# Patient Record
Sex: FEMALE | URBAN - METROPOLITAN AREA
[De-identification: names, ages, dates, MRNs, and addresses within clinical notes are randomized per-mention and may not be internally consistent; named-entity substitution may affect disease eponyms.]

---

## 2018-05-23 ENCOUNTER — APPOINTMENT (RX ONLY)
Dept: URBAN - METROPOLITAN AREA CLINIC 157 | Facility: CLINIC | Age: 73
Setting detail: DERMATOLOGY
End: 2018-05-23

## 2018-05-23 DIAGNOSIS — L50.8 OTHER URTICARIA: ICD-10-CM | Status: INADEQUATELY CONTROLLED

## 2018-05-23 DIAGNOSIS — R23.1 PALLOR: ICD-10-CM

## 2018-05-23 PROBLEM — I10 ESSENTIAL (PRIMARY) HYPERTENSION: Status: ACTIVE | Noted: 2018-05-23

## 2018-05-23 PROCEDURE — 99202 OFFICE O/P NEW SF 15 MIN: CPT

## 2018-05-23 PROCEDURE — ? DIAGNOSIS COMMENT

## 2018-05-23 PROCEDURE — ? COUNSELING

## 2018-05-23 PROCEDURE — ? ORDER TESTS

## 2018-05-23 ASSESSMENT — LOCATION SIMPLE DESCRIPTION DERM
LOCATION SIMPLE: RIGHT FOREARM
LOCATION SIMPLE: LEFT ANTERIOR NECK
LOCATION SIMPLE: LEFT UPPER BACK
LOCATION SIMPLE: LEFT FOREARM

## 2018-05-23 ASSESSMENT — LOCATION ZONE DERM
LOCATION ZONE: TRUNK
LOCATION ZONE: NECK
LOCATION ZONE: ARM

## 2018-05-23 ASSESSMENT — LOCATION DETAILED DESCRIPTION DERM
LOCATION DETAILED: LEFT INFERIOR ANTERIOR NECK
LOCATION DETAILED: RIGHT VENTRAL PROXIMAL FOREARM
LOCATION DETAILED: LEFT VENTRAL DISTAL FOREARM
LOCATION DETAILED: LEFT SUPERIOR MEDIAL UPPER BACK

## 2018-05-23 NOTE — PROCEDURE: ORDER TESTS
Billing Type: Third-Party Bill
Lab Facility: [id number]
Bill For Surgical Tray: no
Expected Date Of Service: 05/23/2018
Performing Laboratory: [id number]

## 2018-05-23 NOTE — PROCEDURE: DIAGNOSIS COMMENT
Comment: Patient has a history of urticaria for the past 6 months. Currently on Zyrtec 10mg PO BID and Hydroxyzine 25mg PO at night. Will await lab results
Detail Level: Simple

## 2018-05-29 ENCOUNTER — RX ONLY (OUTPATIENT)
Age: 73
Setting detail: RX ONLY
End: 2018-05-29

## 2018-05-29 RX ORDER — SALICYLIC ACID 3 G/100G
LOTION TOPICAL
Qty: 30 | Refills: 0 | Status: ERX | COMMUNITY
Start: 2018-05-29

## 2018-05-29 RX ORDER — TRIAMCINOLONE ACETONIDE 1 MG/G
CREAM TOPICAL
Qty: 454 | Refills: 0 | Status: ERX

## 2018-07-05 ENCOUNTER — APPOINTMENT (RX ONLY)
Dept: URBAN - METROPOLITAN AREA CLINIC 157 | Facility: CLINIC | Age: 73
Setting detail: DERMATOLOGY
End: 2018-07-05

## 2018-07-05 DIAGNOSIS — L50.8 OTHER URTICARIA: ICD-10-CM

## 2018-07-05 PROCEDURE — ? COUNSELING

## 2018-07-05 PROCEDURE — ? DIAGNOSIS COMMENT

## 2018-07-05 PROCEDURE — 99213 OFFICE O/P EST LOW 20 MIN: CPT

## 2018-07-05 PROCEDURE — ? TREATMENT REGIMEN

## 2018-07-05 ASSESSMENT — LOCATION SIMPLE DESCRIPTION DERM
LOCATION SIMPLE: RIGHT POSTERIOR UPPER ARM
LOCATION SIMPLE: LEFT UPPER BACK
LOCATION SIMPLE: LEFT POSTERIOR UPPER ARM

## 2018-07-05 ASSESSMENT — LOCATION ZONE DERM
LOCATION ZONE: ARM
LOCATION ZONE: TRUNK

## 2018-07-05 ASSESSMENT — LOCATION DETAILED DESCRIPTION DERM
LOCATION DETAILED: LEFT DISTAL POSTERIOR UPPER ARM
LOCATION DETAILED: LEFT MEDIAL UPPER BACK
LOCATION DETAILED: RIGHT DISTAL POSTERIOR UPPER ARM

## 2018-07-05 NOTE — PROCEDURE: DIAGNOSIS COMMENT
Comment: Recommend to keep taking antihistamines around the clock and to have her thyroid checked at her upcoming appt with her PCP who has been monitoring her thyroid.  Discussed antihistamines cannot be as needed. It needs to be taken around the clock. Patient counseled on starting Xolair and need to carry an Epipen in her purse and to her monthly Xolair injections
Detail Level: Simple

## 2018-07-16 NOTE — PROCEDURE: TREATMENT REGIMEN
Continue Regimen: 10 mg of Zyrtec and 180 mg of Allegra in the morning, 10 mg of Zyrtec at lunch time, and Benadryl and Zyrtec at bedtime
Detail Level: Simple
Alert-The patient is alert, awake and responds to voice. The patient is oriented to time, place, and person. The triage nurse is able to obtain subjective information.

## 2018-07-20 ENCOUNTER — RX ONLY (OUTPATIENT)
Age: 73
Setting detail: RX ONLY
End: 2018-07-20

## 2018-07-20 RX ORDER — AMPICILLIN TRIHYDRATE 500 MG
CAPSULE ORAL
Qty: 30 | Refills: 1 | Status: ERX | COMMUNITY
Start: 2018-07-20

## 2018-07-20 RX ORDER — PREDNISONE 5 MG/1
TABLET ORAL
Qty: 30 | Refills: 0 | Status: ERX | COMMUNITY
Start: 2018-07-20

## 2018-07-20 RX ORDER — CALCIUM CARBONATE 500(1250)
TABLET ORAL
Qty: 30 | Refills: 1 | Status: ERX | COMMUNITY
Start: 2018-07-20

## 2018-08-10 ENCOUNTER — APPOINTMENT (RX ONLY)
Dept: URBAN - METROPOLITAN AREA CLINIC 156 | Facility: CLINIC | Age: 73
Setting detail: DERMATOLOGY
End: 2018-08-10

## 2018-08-10 VITALS — SYSTOLIC BLOOD PRESSURE: 127 MMHG | DIASTOLIC BLOOD PRESSURE: 60 MMHG | HEART RATE: 60 BPM | RESPIRATION RATE: 16 BRPM

## 2018-08-10 VITALS — HEART RATE: 59 BPM | RESPIRATION RATE: 16 BRPM | DIASTOLIC BLOOD PRESSURE: 60 MMHG | SYSTOLIC BLOOD PRESSURE: 108 MMHG

## 2018-08-10 DIAGNOSIS — L50.8 OTHER URTICARIA: ICD-10-CM

## 2018-08-10 PROCEDURE — ? TREATMENT REGIMEN

## 2018-08-10 PROCEDURE — ? XOLAIR INJECTION

## 2018-08-10 PROCEDURE — 99212 OFFICE O/P EST SF 10 MIN: CPT | Mod: 25

## 2018-08-10 PROCEDURE — ? OTHER

## 2018-08-10 PROCEDURE — ? SEPARATE AND IDENTIFIABLE DOCUMENTATION

## 2018-08-10 PROCEDURE — ? COUNSELING

## 2018-08-10 NOTE — PROCEDURE: XOLAIR INJECTION
Location Of Injection #2: left arm
Next Injection Location: in the office
Bill J-Code: yes
Next Injection: 4 weeks
Medication (Total Amount Injected): Xolair 150 mg
Number Of Injections: Two
Administered By (Optional): Sade Driscoll CMA
Detail Level: Simple
Expiration Date (Optional): 11/2021
Location Of Injection #1: right arm
Lot # (Optional): [id number]
Consent: The risks of the medication were reviewed with the patient.
Other Lot # (Optional): [id number]

## 2018-08-10 NOTE — PROCEDURE: TREATMENT REGIMEN
Continue Regimen: Zyrtec 10mg and 180 mg of Allegra in the morning, Zyrtec 10mg at lunch time, and Benadryl and Zyrtec at bedtime
Detail Level: Simple

## 2018-09-07 ENCOUNTER — APPOINTMENT (RX ONLY)
Dept: URBAN - METROPOLITAN AREA CLINIC 156 | Facility: CLINIC | Age: 73
Setting detail: DERMATOLOGY
End: 2018-09-07

## 2018-09-07 VITALS — SYSTOLIC BLOOD PRESSURE: 139 MMHG | HEART RATE: 58 BPM | DIASTOLIC BLOOD PRESSURE: 76 MMHG

## 2018-09-07 DIAGNOSIS — L50.8 OTHER URTICARIA: ICD-10-CM

## 2018-09-07 PROCEDURE — ? TREATMENT REGIMEN

## 2018-09-07 PROCEDURE — ? XOLAIR INJECTION

## 2018-09-07 PROCEDURE — ? COUNSELING

## 2018-09-07 ASSESSMENT — LOCATION SIMPLE DESCRIPTION DERM
LOCATION SIMPLE: RIGHT FOREARM
LOCATION SIMPLE: LEFT FOREARM
LOCATION SIMPLE: ABDOMEN

## 2018-09-07 ASSESSMENT — LOCATION DETAILED DESCRIPTION DERM
LOCATION DETAILED: EPIGASTRIC SKIN
LOCATION DETAILED: LEFT VENTRAL PROXIMAL FOREARM
LOCATION DETAILED: RIGHT VENTRAL PROXIMAL FOREARM

## 2018-09-07 ASSESSMENT — LOCATION ZONE DERM
LOCATION ZONE: TRUNK
LOCATION ZONE: ARM

## 2018-09-07 NOTE — PROCEDURE: XOLAIR INJECTION
Lot # (Optional): [id number]
Number Of Injections: One
Detail Level: Simple
Next Injection Location: in the office
Expiration Date (Optional): 2/2022
Bill J-Code: yes
Consent: The risks of the medication were reviewed with the patient.

## 2018-09-07 NOTE — PROCEDURE: TREATMENT REGIMEN
Initiate Treatment: Allegra 180 mg - one tablet daily
Plan: Advised to discontinue Zyrtec and start Xyzal\\nEpipen present in office today
Detail Level: Zone
Samples Given: Nolix cream - apply to affected areas twice daily as needed

## 2018-10-12 ENCOUNTER — APPOINTMENT (RX ONLY)
Dept: URBAN - METROPOLITAN AREA CLINIC 156 | Facility: CLINIC | Age: 73
Setting detail: DERMATOLOGY
End: 2018-10-12

## 2018-10-12 DIAGNOSIS — L50.8 OTHER URTICARIA: ICD-10-CM

## 2018-10-12 PROCEDURE — ? DIAGNOSIS COMMENT

## 2018-10-12 PROCEDURE — ? TREATMENT REGIMEN

## 2018-10-12 PROCEDURE — ? COUNSELING

## 2018-10-12 PROCEDURE — ? SEPARATE AND IDENTIFIABLE DOCUMENTATION

## 2018-10-12 PROCEDURE — ? XOLAIR INJECTION

## 2018-10-12 PROCEDURE — 99213 OFFICE O/P EST LOW 20 MIN: CPT | Mod: 25

## 2018-10-12 ASSESSMENT — LOCATION DETAILED DESCRIPTION DERM
LOCATION DETAILED: RIGHT VENTRAL PROXIMAL FOREARM
LOCATION DETAILED: LEFT PROXIMAL POSTERIOR UPPER ARM
LOCATION DETAILED: PERIUMBILICAL SKIN
LOCATION DETAILED: LEFT VENTRAL PROXIMAL FOREARM
LOCATION DETAILED: RIGHT PROXIMAL POSTERIOR UPPER ARM

## 2018-10-12 ASSESSMENT — LOCATION SIMPLE DESCRIPTION DERM
LOCATION SIMPLE: LEFT POSTERIOR UPPER ARM
LOCATION SIMPLE: RIGHT FOREARM
LOCATION SIMPLE: ABDOMEN
LOCATION SIMPLE: LEFT FOREARM
LOCATION SIMPLE: RIGHT POSTERIOR UPPER ARM

## 2018-10-12 ASSESSMENT — LOCATION ZONE DERM
LOCATION ZONE: TRUNK
LOCATION ZONE: ARM

## 2018-10-12 NOTE — PROCEDURE: XOLAIR INJECTION
Expiration Date (Optional): 
Medication (Total Amount Injected): Xolair 150 mg
Lot # (Optional): [id number]
Number Of Injections: One
Next Injection Location: in the office
Bill J-Code: yes
Consent: The risks of the medication were reviewed with the patient.
Detail Level: Simple

## 2018-10-12 NOTE — PROCEDURE: TREATMENT REGIMEN
Continue Regimen: Allegra 180 mg once daily
Detail Level: Zone
Plan: Epipen present in office today
Discontinue Regimen: Zyrtec and start Xyzal

## 2018-11-20 ENCOUNTER — APPOINTMENT (RX ONLY)
Dept: URBAN - METROPOLITAN AREA CLINIC 156 | Facility: CLINIC | Age: 73
Setting detail: DERMATOLOGY
End: 2018-11-20

## 2018-11-20 VITALS — WEIGHT: 135 LBS | HEART RATE: 62 BPM | SYSTOLIC BLOOD PRESSURE: 105 MMHG | DIASTOLIC BLOOD PRESSURE: 68 MMHG

## 2018-11-20 DIAGNOSIS — L50.8 OTHER URTICARIA: ICD-10-CM | Status: STABLE

## 2018-11-20 PROCEDURE — ? TREATMENT REGIMEN

## 2018-11-20 PROCEDURE — 99212 OFFICE O/P EST SF 10 MIN: CPT | Mod: 25

## 2018-11-20 PROCEDURE — ? DIAGNOSIS COMMENT

## 2018-11-20 PROCEDURE — ? COUNSELING

## 2018-11-20 PROCEDURE — ? XOLAIR INJECTION

## 2018-11-20 PROCEDURE — ? SEPARATE AND IDENTIFIABLE DOCUMENTATION

## 2018-11-20 ASSESSMENT — LOCATION SIMPLE DESCRIPTION DERM
LOCATION SIMPLE: RIGHT POSTERIOR UPPER ARM
LOCATION SIMPLE: LEFT FOREARM
LOCATION SIMPLE: ABDOMEN
LOCATION SIMPLE: RIGHT FOREARM
LOCATION SIMPLE: LEFT POSTERIOR UPPER ARM

## 2018-11-20 ASSESSMENT — LOCATION ZONE DERM
LOCATION ZONE: TRUNK
LOCATION ZONE: ARM

## 2018-11-20 ASSESSMENT — LOCATION DETAILED DESCRIPTION DERM
LOCATION DETAILED: PERIUMBILICAL SKIN
LOCATION DETAILED: RIGHT VENTRAL PROXIMAL FOREARM
LOCATION DETAILED: LEFT VENTRAL PROXIMAL FOREARM
LOCATION DETAILED: RIGHT PROXIMAL POSTERIOR UPPER ARM
LOCATION DETAILED: LEFT PROXIMAL POSTERIOR UPPER ARM

## 2018-11-20 NOTE — PROCEDURE: XOLAIR INJECTION
Consent: The risks of the medication were reviewed with the patient.
Location Of Injection #2: right arm
Expiration Date (Optional): 
Administered By (Optional): ENRIQUETA Barragan
Bill J-Code: yes
Number Of Injections: One
Medication (Total Amount Injected): Xolair 150 mg
Next Injection Location: in the office
Other Expiration Date (Optional): 
Detail Level: Simple
Location Of Injection #1: left arm
Lot # (Optional): [id number]
Other Lot # (Optional): [id number]
Treatment Number (Optional): 4

## 2018-11-20 NOTE — PROCEDURE: TREATMENT REGIMEN
Discontinue Regimen: Zyrtec and start Xyzal
Plan: Epipen present in office today
Detail Level: Zone
Continue Regimen: Allegra 180 mg once daily

## 2018-11-28 ENCOUNTER — RX ONLY (OUTPATIENT)
Age: 73
Setting detail: RX ONLY
End: 2018-11-28

## 2018-11-28 RX ORDER — OMALIZUMAB 202.5 MG/1.4ML
INJECTION, SOLUTION SUBCUTANEOUS
Qty: 2 | Refills: 2 | Status: ERX | COMMUNITY
Start: 2018-11-28

## 2018-12-20 ENCOUNTER — APPOINTMENT (RX ONLY)
Dept: URBAN - METROPOLITAN AREA CLINIC 156 | Facility: CLINIC | Age: 73
Setting detail: DERMATOLOGY
End: 2018-12-20

## 2018-12-20 VITALS — HEART RATE: 72 BPM | DIASTOLIC BLOOD PRESSURE: 74 MMHG | SYSTOLIC BLOOD PRESSURE: 112 MMHG

## 2018-12-20 DIAGNOSIS — L50.8 OTHER URTICARIA: ICD-10-CM | Status: IMPROVED

## 2018-12-20 PROCEDURE — 96372 THER/PROPH/DIAG INJ SC/IM: CPT

## 2018-12-20 PROCEDURE — 99212 OFFICE O/P EST SF 10 MIN: CPT | Mod: 25

## 2018-12-20 PROCEDURE — ? COUNSELING

## 2018-12-20 PROCEDURE — ? XOLAIR INJECTION

## 2018-12-20 PROCEDURE — ? SEPARATE AND IDENTIFIABLE DOCUMENTATION

## 2018-12-20 PROCEDURE — ? TREATMENT REGIMEN

## 2018-12-20 NOTE — PROCEDURE: XOLAIR INJECTION
Lot # (Optional): [id number]
Bill J-Code: no
Consent: The risks of the medication were reviewed with the patient.
Location Of Injection #2: left arm
Next Injection: 4 weeks
Other Expiration Date (Optional): MAR 2022
Other Lot # (Optional): [id number]
Next Injection Location: in the office
Number Of Injections: Two
Location Of Injection #1: right arm
Medication (Total Amount Injected): Xolair 150 mg
Procedure Type: Therapeutic, prophylactic, or diagnostic injection; subcutaneous or intramuscular; CPT: 46284
Administered By (Optional): Marifer Castellano, ENRIQUETA
Treatment Number (Optional): 5
Detail Level: None

## 2018-12-20 NOTE — PROCEDURE: TREATMENT REGIMEN
Initiate Treatment: Singular daily
Plan: Patient has epipen in office today.
Detail Level: Simple
Continue Regimen: Continue Allegra, Zyrtec and Xyzal daily.

## 2019-01-17 ENCOUNTER — APPOINTMENT (RX ONLY)
Dept: URBAN - METROPOLITAN AREA CLINIC 156 | Facility: CLINIC | Age: 74
Setting detail: DERMATOLOGY
End: 2019-01-17

## 2019-01-17 VITALS — DIASTOLIC BLOOD PRESSURE: 68 MMHG | SYSTOLIC BLOOD PRESSURE: 142 MMHG

## 2019-01-17 DIAGNOSIS — L50.8 OTHER URTICARIA: ICD-10-CM | Status: WORSENING

## 2019-01-17 PROCEDURE — ? COUNSELING

## 2019-01-17 PROCEDURE — ? XOLAIR INJECTION

## 2019-01-17 PROCEDURE — ? TREATMENT REGIMEN

## 2019-01-17 PROCEDURE — 96372 THER/PROPH/DIAG INJ SC/IM: CPT

## 2019-01-17 PROCEDURE — ? PRESCRIPTION

## 2019-01-17 PROCEDURE — 99213 OFFICE O/P EST LOW 20 MIN: CPT | Mod: 25

## 2019-01-17 PROCEDURE — ? SEPARATE AND IDENTIFIABLE DOCUMENTATION

## 2019-01-17 RX ORDER — MONTELUKAST SODIUM 10 MG/1
TABLET, FILM COATED ORAL
Qty: 30 | Refills: 5 | Status: ERX | COMMUNITY
Start: 2019-01-17

## 2019-01-17 RX ADMIN — MONTELUKAST SODIUM: 10 TABLET, FILM COATED ORAL at 00:00

## 2019-01-17 ASSESSMENT — LOCATION SIMPLE DESCRIPTION DERM
LOCATION SIMPLE: LOWER BACK
LOCATION SIMPLE: ABDOMEN

## 2019-01-17 ASSESSMENT — LOCATION DETAILED DESCRIPTION DERM
LOCATION DETAILED: SUPERIOR LUMBAR SPINE
LOCATION DETAILED: EPIGASTRIC SKIN

## 2019-01-17 ASSESSMENT — LOCATION ZONE DERM: LOCATION ZONE: TRUNK

## 2019-01-17 NOTE — PROCEDURE: TREATMENT REGIMEN
Plan: Patient has epipen present today.
Initiate Treatment: Begin Singulair 10mg one tablet daily
Continue Regimen: Continue Allegra, Zyrtec, and Xyzal
Detail Level: Simple

## 2019-01-17 NOTE — PROCEDURE: XOLAIR INJECTION
Consent: The risks of the medication were reviewed with the patient.
Procedure Type: Therapeutic, prophylactic, or diagnostic injection; subcutaneous or intramuscular; CPT: 45386
Next Injection Location: in the office
Expiration Date (Optional): 04/2022
Lot # (Optional): [id number]
Medication (Total Amount Injected): Xolair 150 mg
Detail Level: Simple
Next Injection: 4 weeks
Treatment Number (Optional): 6
Location Of Injection #1: right arm
Bill J-Code: yes
Number Of Injections: Two
Other Lot # (Optional): [id number]
Location Of Injection #2: left arm
Administered By (Optional): Efraín Eid, ENRIQUETA

## 2019-02-21 ENCOUNTER — RX ONLY (OUTPATIENT)
Age: 74
Setting detail: RX ONLY
End: 2019-02-21

## 2019-02-21 ENCOUNTER — APPOINTMENT (RX ONLY)
Dept: URBAN - METROPOLITAN AREA CLINIC 156 | Facility: CLINIC | Age: 74
Setting detail: DERMATOLOGY
End: 2019-02-21

## 2019-02-21 DIAGNOSIS — L50.8 OTHER URTICARIA: ICD-10-CM

## 2019-02-21 PROCEDURE — ? DIAGNOSIS COMMENT

## 2019-02-21 PROCEDURE — 96372 THER/PROPH/DIAG INJ SC/IM: CPT

## 2019-02-21 PROCEDURE — ? XOLAIR INJECTION

## 2019-02-21 PROCEDURE — ? SEPARATE AND IDENTIFIABLE DOCUMENTATION

## 2019-02-21 PROCEDURE — ? COUNSELING

## 2019-02-21 PROCEDURE — ? TREATMENT REGIMEN

## 2019-02-21 PROCEDURE — 99213 OFFICE O/P EST LOW 20 MIN: CPT | Mod: 25

## 2019-02-21 RX ORDER — OMALIZUMAB 150 MG/ML
INJECTION, SOLUTION SUBCUTANEOUS
Qty: 2 | Refills: 2 | Status: ERX | COMMUNITY
Start: 2019-02-21

## 2019-02-21 ASSESSMENT — LOCATION ZONE DERM: LOCATION ZONE: TRUNK

## 2019-02-21 ASSESSMENT — LOCATION DETAILED DESCRIPTION DERM
LOCATION DETAILED: SUPERIOR LUMBAR SPINE
LOCATION DETAILED: EPIGASTRIC SKIN

## 2019-02-21 ASSESSMENT — LOCATION SIMPLE DESCRIPTION DERM
LOCATION SIMPLE: LOWER BACK
LOCATION SIMPLE: ABDOMEN

## 2019-02-21 NOTE — PROCEDURE: XOLAIR INJECTION
Lot # (Optional): [id number]
Medication (Total Amount Injected): Xolair 150 mg
Administered By (Optional): Doreen Padilla, ENRIQUETA
Next Injection Location: in the office
Bill J-Code: yes
Other Lot # (Optional): [id number]
Consent: The risks of the medication were reviewed with the patient.
Expiration Date (Optional): 05/2022
Detail Level: Simple
Next Injection: 4 weeks
Other Expiration Date (Optional): 04/2022
Location Of Injection #1: right arm
Number Of Injections: Two
Location Of Injection #2: left arm
Procedure Type: Therapeutic, prophylactic, or diagnostic injection; subcutaneous or intramuscular; CPT: 47884
Treatment Number (Optional): 7

## 2019-02-21 NOTE — PROCEDURE: DIAGNOSIS COMMENT
Detail Level: Simple
Comment: She says her PCP leonides thyroid labs and they are abnormal.  I will obtain these labs and review them.  If they are abnormal, it may explain her chronic urticaria.

## 2019-02-21 NOTE — PROCEDURE: TREATMENT REGIMEN
Detail Level: Simple
Continue Regimen: Continue Singulair, Allegra, Zyrtec, and Xyzal
Plan: Patient has epipen present today. Patient had labs done with PCP to r/o thyroid disease. Will sign medical release form to have recent labs faxed to our office for review.

## 2022-02-10 ENCOUNTER — APPOINTMENT (RX ONLY)
Dept: URBAN - METROPOLITAN AREA CLINIC 156 | Facility: CLINIC | Age: 77
Setting detail: DERMATOLOGY
End: 2022-02-10

## 2022-02-10 VITALS — HEIGHT: 61 IN | WEIGHT: 129 LBS

## 2022-02-10 DIAGNOSIS — L50.8 OTHER URTICARIA: ICD-10-CM | Status: INADEQUATELY CONTROLLED

## 2022-02-10 DIAGNOSIS — L98.8 OTHER SPECIFIED DISORDERS OF THE SKIN AND SUBCUTANEOUS TISSUE: ICD-10-CM | Status: INADEQUATELY CONTROLLED

## 2022-02-10 PROCEDURE — ? VENIPUNCTURE

## 2022-02-10 PROCEDURE — 36415 COLL VENOUS BLD VENIPUNCTURE: CPT

## 2022-02-10 PROCEDURE — 99214 OFFICE O/P EST MOD 30 MIN: CPT | Mod: 25

## 2022-02-10 PROCEDURE — ? ORDER TESTS

## 2022-02-10 PROCEDURE — ? COUNSELING

## 2022-02-10 PROCEDURE — ? RECOMMENDATIONS

## 2022-02-10 ASSESSMENT — LOCATION DETAILED DESCRIPTION DERM
LOCATION DETAILED: LEFT INFERIOR CENTRAL MALAR CHEEK
LOCATION DETAILED: LEFT ANTECUBITAL SKIN
LOCATION DETAILED: LEFT INFERIOR MEDIAL UPPER BACK

## 2022-02-10 ASSESSMENT — LOCATION ZONE DERM
LOCATION ZONE: FACE
LOCATION ZONE: TRUNK
LOCATION ZONE: ARM

## 2022-02-10 ASSESSMENT — LOCATION SIMPLE DESCRIPTION DERM
LOCATION SIMPLE: LEFT UPPER BACK
LOCATION SIMPLE: LEFT UPPER ARM
LOCATION SIMPLE: LEFT CHEEK

## 2022-02-10 NOTE — PROCEDURE: ORDER TESTS
Bill For Surgical Tray: no
Performing Laboratory: [id number]
Billing Type: Third-Party Bill
Expected Date Of Service: 02/10/2022

## 2022-02-10 NOTE — PROCEDURE: RECOMMENDATIONS
Detail Level: Zone
Recommendation Override: Recommended that patient see Dr. Ochoa for treatments at [facility]
Render Risk Assessment In Note?: no

## 2022-09-22 ENCOUNTER — APPOINTMENT (RX ONLY)
Dept: URBAN - METROPOLITAN AREA CLINIC 154 | Facility: CLINIC | Age: 77
Setting detail: DERMATOLOGY
End: 2022-09-22

## 2022-09-22 DIAGNOSIS — Z41.9 ENCOUNTER FOR PROCEDURE FOR PURPOSES OTHER THAN REMEDYING HEALTH STATE, UNSPECIFIED: ICD-10-CM

## 2022-09-22 PROCEDURE — ? FILLERS

## 2022-09-22 NOTE — PROCEDURE: FILLERS
Mid Face Filler  Volume In Cc: 0
Expiration Date (Month Year): 08/24
Additional Area 1 Location: mental crease
Lot #: [id number]
Marionette Lines Filler  Volume In Cc: 1.2
Lot #: [id number]
Anesthesia Type: 1% lidocaine with epinephrine
Aspiration Statement: Aspiration was performed prior to injecting site with filler.
Include Cannula Information In Note?: No
Additional Area 1 Location: Perioral lines
Map Statment: See Attach Map for Complete Details
Filler: Versa+
Post-Care Instructions: Patient instructed to apply ice to reduce swelling.
Consent: Written consent obtained. Risks include but not limited to bruising, beading, irregular texture, ulceration, infection, allergic reaction, scar formation, incomplete augmentation, temporary nature, procedural pain.
Expiration Date (Month Year): 06/23
Additional Area 1 Location: Vertical rhytids and perioral lines
Additional Area 1 Location: perioral vertical rhytids
Lot #: [id number]
Additional Anesthesia Volume In Cc: 6
Detail Level: Detailed
Expiration Date (Month Year): 08/24/23

## 2022-12-28 ENCOUNTER — APPOINTMENT (RX ONLY)
Dept: URBAN - METROPOLITAN AREA CLINIC 154 | Facility: CLINIC | Age: 77
Setting detail: DERMATOLOGY
End: 2022-12-28

## 2022-12-28 DIAGNOSIS — Z41.9 ENCOUNTER FOR PROCEDURE FOR PURPOSES OTHER THAN REMEDYING HEALTH STATE, UNSPECIFIED: ICD-10-CM

## 2022-12-28 PROCEDURE — ? OTHER

## 2022-12-28 NOTE — PROCEDURE: OTHER
Detail Level: Zone
Other (Free Text): Will treat with RHA special. RHA 4 to R cheek and NLF and RHA 2 to marionettes and lips. And Redensity to lips
Render Risk Assessment In Note?: no
Note Text (......Xxx Chief Complaint.): This diagnosis correlates with the

## 2023-01-10 ENCOUNTER — APPOINTMENT (RX ONLY)
Dept: URBAN - METROPOLITAN AREA CLINIC 154 | Facility: CLINIC | Age: 78
Setting detail: DERMATOLOGY
End: 2023-01-10

## 2023-01-10 DIAGNOSIS — Z41.9 ENCOUNTER FOR PROCEDURE FOR PURPOSES OTHER THAN REMEDYING HEALTH STATE, UNSPECIFIED: ICD-10-CM

## 2023-01-10 PROCEDURE — ? OTHER

## 2023-01-10 NOTE — PROCEDURE: OTHER
Note Text (......Xxx Chief Complaint.): This diagnosis correlates with the
Detail Level: Zone
Render Risk Assessment In Note?: no
Other (Free Text): Was scheduled for RHA special but is currently sick. We rescheduled for this Saturday

## 2023-01-14 ENCOUNTER — APPOINTMENT (RX ONLY)
Dept: URBAN - METROPOLITAN AREA CLINIC 154 | Facility: CLINIC | Age: 78
Setting detail: DERMATOLOGY
End: 2023-01-14

## 2023-01-14 DIAGNOSIS — Z41.9 ENCOUNTER FOR PROCEDURE FOR PURPOSES OTHER THAN REMEDYING HEALTH STATE, UNSPECIFIED: ICD-10-CM

## 2023-01-14 PROCEDURE — ? DAXXIFY

## 2023-01-14 PROCEDURE — ? FILLERS

## 2023-01-14 NOTE — PROCEDURE: FILLERS
Jawline Filler Volume In Cc: 0
Expiration Date (Month Year): 06/23
Detail Level: Detailed
Filler: RHA 4
Include Cannula Information In Note?: No
Lot #: [id number]
Cheeks Filler Volume In Cc: 2
Vermilion Lips Filler Volume In Cc: 1
Expiration Date (Month Year): 08/24
Anesthesia Type: 1% lidocaine with epinephrine
Additional Area 1 Location: Chin
Consent: Written consent obtained. Risks include but not limited to bruising, beading, irregular texture, ulceration, infection, allergic reaction, scar formation, incomplete augmentation, temporary nature, procedural pain.
Post-Care Instructions: Patient instructed to apply ice to reduce swelling.
Additional Area 1 Location: perioral lines
Lot #: [id number]
Map Statment: See Attach Map for Complete Details
Aspiration Statement: Aspiration was performed prior to injecting site with filler.
Additional Anesthesia Volume In Cc: 6
Expiration Date (Month Year): 08/24/23
Lot #: [id number]
Filler: RHA 2

## 2023-01-14 NOTE — PROCEDURE: DAXXIFY
Show Orbicularis Oculi Units: Yes
Show Right And Left Periorbital Units: No
Right Pupillary Line Units: 0
Dilution (U/0.1 Cc): 4
Consent: Written consent obtained. Risks include but not limited to lid/brow ptosis, bruising, swelling, diplopia, temporary effect, incomplete chemical denervation.
Additional Area 2 Location: L axilla
Post-Care Instructions: Patient instructed to not lie down for 4 hours and limit physical activity for 24 hours.
Detail Level: Detailed
Additional Area 1 Location: R axilla
Glabellar Complex Units: 40

## 2023-01-25 ENCOUNTER — APPOINTMENT (RX ONLY)
Dept: URBAN - METROPOLITAN AREA CLINIC 154 | Facility: CLINIC | Age: 78
Setting detail: DERMATOLOGY
End: 2023-01-25

## 2023-01-25 DIAGNOSIS — Z41.9 ENCOUNTER FOR PROCEDURE FOR PURPOSES OTHER THAN REMEDYING HEALTH STATE, UNSPECIFIED: ICD-10-CM

## 2023-01-25 PROCEDURE — ? OTHER

## 2023-01-25 NOTE — PROCEDURE: OTHER
Render Risk Assessment In Note?: no
Detail Level: Zone
Other (Free Text): Had concern regarding asymmetry in her cheeks. Was happy when she left appt the other day but feels like all she gained in R cheek has “fallen” we discussed the need for a face lift as she has so much skin laxity.
Note Text (......Xxx Chief Complaint.): This diagnosis correlates with the

## 2023-05-11 ENCOUNTER — APPOINTMENT (RX ONLY)
Dept: URBAN - METROPOLITAN AREA CLINIC 154 | Facility: CLINIC | Age: 78
Setting detail: DERMATOLOGY
End: 2023-05-11

## 2023-05-11 DIAGNOSIS — Z41.9 ENCOUNTER FOR PROCEDURE FOR PURPOSES OTHER THAN REMEDYING HEALTH STATE, UNSPECIFIED: ICD-10-CM

## 2023-05-11 PROCEDURE — ? OTHER (COSMETIC)

## 2023-05-11 NOTE — PROCEDURE: OTHER (COSMETIC)
Detail Level: Zone
Other (Free Text): Recommend HD radiesse on jawline and preauricular area. Versa in the lips and some in the marionette and Defyne in the chin.

## 2025-02-18 ENCOUNTER — APPOINTMENT (OUTPATIENT)
Dept: URBAN - METROPOLITAN AREA CLINIC 154 | Facility: CLINIC | Age: 80
Setting detail: DERMATOLOGY
End: 2025-02-18

## 2025-02-18 DIAGNOSIS — Z41.9 ENCOUNTER FOR PROCEDURE FOR PURPOSES OTHER THAN REMEDYING HEALTH STATE, UNSPECIFIED: ICD-10-CM

## 2025-02-18 PROCEDURE — ? COSMETIC CONSULTATION: FILLERS

## 2025-07-09 ENCOUNTER — APPOINTMENT (OUTPATIENT)
Dept: URBAN - METROPOLITAN AREA CLINIC 154 | Facility: CLINIC | Age: 80
Setting detail: DERMATOLOGY
End: 2025-07-09

## 2025-07-09 DIAGNOSIS — Z41.9 ENCOUNTER FOR PROCEDURE FOR PURPOSES OTHER THAN REMEDYING HEALTH STATE, UNSPECIFIED: ICD-10-CM

## 2025-07-09 PROCEDURE — ? DAXXIFY

## 2025-07-09 PROCEDURE — ? FILLERS

## 2025-07-09 NOTE — PROCEDURE: FILLERS
Jawline Filler Volume In Cc: 0
Map Statment: See Attach Map for Complete Details
Include Cannula Information In Note?: No
Filler: RHA 2
Lot #: [id number]
Additional Area 1 Location: perioral lines
Additional Area 1 Volume In Cc: 1
Expiration Date (Month Year): 06/23
Consent: Written consent obtained. Risks include but not limited to bruising, beading, irregular texture, ulceration, infection, allergic reaction, scar formation, incomplete augmentation, temporary nature, procedural pain.
Additional Area 2 Location: lateral smile jose
Aspiration Statement: Aspiration was performed prior to injecting site with filler.
Nasolabial Folds Filler Volume In Cc: 0.5
Detail Level: Detailed
Post-Care Instructions: Patient instructed to apply ice to reduce swelling.
Filler: RHA Redensity
Anesthesia Type: 1% lidocaine with epinephrine
Lot #: [id number]
Expiration Date (Month Year): 08/24
Additional Anesthesia Volume In Cc: 6
Lot #: [id number]
Additional Area 1 Location: Chin
Expiration Date (Month Year): 08/24/23

## 2025-07-09 NOTE — PROCEDURE: DAXXIFY
Inferior Lateral Orbicularis Oculi Units: 0
Show Mentalis Units: No
Detail Level: Detailed
Show Additional Area 6: Yes
Additional Area 1 Location: jawline
Glabellar Complex Units: 40
Consent: Written consent obtained. Risks include but not limited to lid/brow ptosis, bruising, swelling, diplopia, temporary effect, incomplete chemical denervation.
Depressor Anguli Oris Units: 10
Additional Area 1 Units: 20
Dilution (U/0.1 Cc): 4
Post-Care Instructions: Patient instructed to not lie down for 4 hours and limit physical activity for 24 hours.
Bill Summary Price Listed Below, Or Bill Total Of Units X Price Per Unit?: Bill Summary Price Below

## 2025-07-23 ENCOUNTER — APPOINTMENT (OUTPATIENT)
Dept: URBAN - METROPOLITAN AREA CLINIC 154 | Facility: CLINIC | Age: 80
Setting detail: DERMATOLOGY
End: 2025-07-23

## 2025-07-23 DIAGNOSIS — Z41.9 ENCOUNTER FOR PROCEDURE FOR PURPOSES OTHER THAN REMEDYING HEALTH STATE, UNSPECIFIED: ICD-10-CM

## 2025-07-23 PROCEDURE — ? COSMETIC CONSULTATION: FILLERS

## 2025-08-19 ENCOUNTER — APPOINTMENT (OUTPATIENT)
Dept: URBAN - METROPOLITAN AREA CLINIC 154 | Facility: CLINIC | Age: 80
Setting detail: DERMATOLOGY
End: 2025-08-19

## 2025-08-28 ENCOUNTER — APPOINTMENT (OUTPATIENT)
Dept: URBAN - METROPOLITAN AREA CLINIC 154 | Facility: CLINIC | Age: 80
Setting detail: DERMATOLOGY
End: 2025-08-28

## 2025-09-02 ENCOUNTER — APPOINTMENT (OUTPATIENT)
Dept: URBAN - METROPOLITAN AREA CLINIC 154 | Facility: CLINIC | Age: 80
Setting detail: DERMATOLOGY
End: 2025-09-02

## 2025-09-02 DIAGNOSIS — Z41.9 ENCOUNTER FOR PROCEDURE FOR PURPOSES OTHER THAN REMEDYING HEALTH STATE, UNSPECIFIED: ICD-10-CM

## 2025-09-02 PROCEDURE — ? FILLERS
